# Patient Record
Sex: FEMALE | Race: WHITE | NOT HISPANIC OR LATINO | Employment: OTHER | URBAN - METROPOLITAN AREA
[De-identification: names, ages, dates, MRNs, and addresses within clinical notes are randomized per-mention and may not be internally consistent; named-entity substitution may affect disease eponyms.]

---

## 2017-06-21 ENCOUNTER — HOSPITAL ENCOUNTER (OUTPATIENT)
Dept: RADIOLOGY | Facility: HOSPITAL | Age: 67
Discharge: HOME | End: 2017-06-21
Payer: COMMERCIAL

## 2017-06-21 DIAGNOSIS — Z12.31 VISIT FOR SCREENING MAMMOGRAM: ICD-10-CM

## 2017-06-21 PROCEDURE — G0202 SCR MAMMO BI INCL CAD: HCPCS

## 2018-04-06 ENCOUNTER — TRANSCRIBE ORDERS (OUTPATIENT)
Dept: ADMINISTRATIVE | Facility: HOSPITAL | Age: 68
End: 2018-04-06

## 2018-04-06 DIAGNOSIS — Z12.39 SCREENING BREAST EXAMINATION: Primary | ICD-10-CM

## 2018-05-01 ENCOUNTER — HOSPITAL ENCOUNTER (OUTPATIENT)
Dept: RADIOLOGY | Facility: HOSPITAL | Age: 68
Discharge: HOME | End: 2018-05-01
Payer: COMMERCIAL

## 2018-05-01 DIAGNOSIS — Z12.39 SCREENING BREAST EXAMINATION: ICD-10-CM

## 2018-05-01 PROCEDURE — 77067 SCR MAMMO BI INCL CAD: CPT

## 2021-03-01 ENCOUNTER — EVALUATION (OUTPATIENT)
Dept: PHYSICAL THERAPY | Facility: REHABILITATION | Age: 71
End: 2021-03-01
Payer: MEDICARE

## 2021-03-01 DIAGNOSIS — M72.2 PLANTAR FASCIITIS OF LEFT FOOT: Primary | ICD-10-CM

## 2021-03-01 PROCEDURE — 97161 PT EVAL LOW COMPLEX 20 MIN: CPT | Performed by: PHYSICAL THERAPIST

## 2021-03-01 PROCEDURE — 97110 THERAPEUTIC EXERCISES: CPT | Performed by: PHYSICAL THERAPIST

## 2021-03-01 RX ORDER — NAPROXEN 500 MG/1
500 TABLET ORAL 2 TIMES DAILY WITH MEALS
COMMUNITY

## 2021-03-01 RX ORDER — ACETAMINOPHEN 325 MG/1
650 TABLET ORAL EVERY 6 HOURS PRN
COMMUNITY

## 2021-03-01 NOTE — LETTER
2021    Blanco Hawthorne, [address] [facility] [address]    Patient: Anca Finley   YOB: 1950   Date of Visit: 3/1/2021     Encounter Diagnosis     ICD-10-CM    1  Plantar fasciitis of left foot  M72 2        Dear Dr Meryle Roman: Thank you for your recent referral of Anca Finley  Please review the attached evaluation summary from Molly's recent visit  Please verify that you agree with the plan of care by signing the attached order  If you have any questions or concerns, please do not hesitate to call  I sincerely appreciate the opportunity to share in the care of one of your patients and hope to have another opportunity to work with you in the near future  Sincerely,    Patricia Aviles, PT      Referring Provider:      I certify that I have read the below Plan of Care and certify the need for these services furnished under this plan of treatment while under my care  Blanco Hawthorne, [address] [facility] [address]  Via Fax: [phone]          PT Evaluation     Today's date: 3/1/2021  Patient name: Anca Finley  : 1950  MRN: [id number]  Referring provider: Fina Green DPM  Dx:   Encounter Diagnosis     ICD-10-CM    1  Plantar fasciitis of left foot  M72 2                   Assessment  Assessment details: Anca Finley is a 65 y o  female referred to outpt PT with dx of left heel pain  Pt presents with decrease in left hip strength primarily glute med, positive pain along med calcaneus, and mild ankle weakness  Pt funct is limited with decrease in tolerance to standing greater than 15-20 mins and decrease in walking for distance tolerance  Pt would benefit from skilled PT to address these limitations and max funct  Impairments: impaired physical strength, lacks appropriate home exercise program and pain with function    Goals  Funct 1  Stand 30+ minutes with pain <2/10 x4 weeks    2  Ambulation tolerance for 1/2 x4 weeks  Impairment 1  Increase strength by 1/2 grade x4 weeks  2  Decrease pain by 25 % x4 weeks        Plan  Patient would benefit from: skilled physical therapy  Planned modality interventions: low level laser therapy and cryotherapy  Planned therapy interventions: neuromuscular re-education, strengthening, home exercise program and therapeutic exercise  Frequency: 2x week  Duration in weeks: 4        Subjective Evaluation    History of Present Illness  Mechanism of injury: Pt notes that in November she began with left heel pain with insidious onset  Pt notes that sx's progressed over the next few months and went to see PCP  Pt was then referred to DPM  Pt was issued steroid injection with a "good" amount of relief, but cont to have pain  Pt was placed in [place] and notes similar return of sx's after removing  Pt reports that she has min pain in AM when getting OOB and denies sleep disturbances due to pain  Pt notes pain becomes worse when standing for prolonged periods (greater than 15-20 minutes)  Pt was issued stretching from [facility] and performs regularly with use of ice and golf ball massage  Pt is able to amb around her home without restrictions, but does note attempting to walk for distance would be limited due to knee and left foot pain  Pt is taking tylenol and aleve to assist with pain and sx's with some relief  Pt scores a 51 on her FOTO index  Pt has a scheduled f/u with DPM in 4 weeks on 3/24/21  During objective measures-pt does note that she was dx'd with stress fracture per DPM and was in [place] x4 weeks     Pain  Current pain ratin  At best pain ratin  At worst pain ratin      Diagnostic Tests  X-ray: normal  Patient Goals  Patient goals for therapy: independence with ADLs/IADLs and decreased pain          Objective     Active Range of Motion   Left Ankle/Foot   Normal active range of motion    Additional Active Range of Motion Details  Pt denies pain with AROM tested as above  Passive Range of Motion   Left Ankle/Foot  Normal passive range of motion    Additional Passive Range of Motion Details  Normal PROM including MTP flex/ext pain free  Strength/Myotome Testing     Left Hip   Planes of Motion   Flexion: 5  Extension: 4+  Abduction: 4-  Adduction: 5  External rotation: 4+  Internal rotation: 4+    Right Hip   Planes of Motion   Flexion: 5  Extension: 4+  Abduction: 4+  Adduction: 5  External rotation: 5  Internal rotation: 5    Left Ankle/Foot   Dorsiflexion: 5  Plantar flexion: 5  Inversion: 4+  Eversion: 4+  Great toe flexion: 4+  Great toe extension: 5    Additional Strength Details  Knee flex/ext 5/5 bilat  Pt demo's good talar and forefoot alignment in standing with hip in neutral   Pt maintain good longitudinal arch position as well in standing  After marching in place, pt demo's no lateral toes seen from P to A  Pt is negative TTP, however does c/o sx's occurring as sharp in med calcaneus  Tests   Left Ankle/Foot   Negative for anterior drawer, calcaneal squeeze, eversion talar tilt, inversion talar tilt, navicular drop, syndesmosis squeeze, syndesmosis external rotation, Tinel's sign (tarsal tunnel), valgus stress metatarsophalangeal, varus stress metatarsophalangeal, valgus tilt, varus tilt and windlass  Additional Tests Details  Pt does demo varicose veins present in similar location of pain-primarily dominant in WB           Precautions: None        Manual  3/1/21                                                                                   Neuro Re-Ed             bridges          clamshells           SLS           TR back to bar           OH reach at wall glute squeeze                                          Therex            rec bike w/u            hip abd SL Hep-review NV            heel raises                                                                                                  Gait Training Modalities             LASER heel pain, chronic  8 mins                                 **Pt educated in hip strengthening exercises to help support her LE from higher in chain during standing activities  Pt encouraged to cont to use sneakers within home for proper support

## 2021-03-01 NOTE — PROGRESS NOTES
PT Evaluation     Today's date: 3/1/2021  Patient name: Ezequiel Westbrook  : 1950  MRN: [id number]  Referring provider: Leobardo Wagoner DPM  Dx:   Encounter Diagnosis     ICD-10-CM    1  Plantar fasciitis of left foot  M72 2                   Assessment  Assessment details: Ezequiel Westbrook is a 65 y o  female referred to outpt PT with dx of left heel pain  Pt presents with decrease in left hip strength primarily glute med, positive pain along med calcaneus, and mild ankle weakness  Pt funct is limited with decrease in tolerance to standing greater than 15-20 mins and decrease in walking for distance tolerance  Pt would benefit from skilled PT to address these limitations and max funct  Impairments: impaired physical strength, lacks appropriate home exercise program and pain with function    Goals  Funct 1  Stand 30+ minutes with pain <2/10 x4 weeks  2   Ambulation tolerance for 1/2 x4 weeks  Impairment 1  Increase strength by 1/2 grade x4 weeks  2  Decrease pain by 25 % x4 weeks        Plan  Patient would benefit from: skilled physical therapy  Planned modality interventions: low level laser therapy and cryotherapy  Planned therapy interventions: neuromuscular re-education, strengthening, home exercise program and therapeutic exercise  Frequency: 2x week  Duration in weeks: 4        Subjective Evaluation    History of Present Illness  Mechanism of injury: Pt notes that in November she began with left heel pain with insidious onset  Pt notes that sx's progressed over the next few months and went to see PCP  Pt was then referred to [facility]  Pt was issued steroid injection with a "good" amount of relief, but cont to have pain  [facility] and notes similar return of sx's after removing  Pt reports that she has min pain in AM when getting OOB and denies sleep disturbances due to pain  Pt notes pain becomes worse when standing for prolonged periods (greater than 15-20 minutes)    Pt was issued stretching from [place] and performs regularly with use of ice and golf ball massage  Pt is able to amb around her home without restrictions, but does note attempting to walk for distance would be limited due to knee and left foot pain  Pt is taking tylenol and aleve to assist with pain and sx's with some relief  Pt scores a 51 on her FOTO index  Pt has a scheduled f/u with DPM in 4 weeks on 3/24/21  During objective measures-pt does note that she was dx'd with stress fracture per DPM and was in [place] x4 weeks  Pain  Current pain ratin  At best pain ratin  At worst pain ratin      Diagnostic Tests  X-ray: normal  Patient Goals  Patient goals for therapy: independence with ADLs/IADLs and decreased pain          Objective     Active Range of Motion   Left Ankle/Foot   Normal active range of motion    Additional Active Range of Motion Details  Pt denies pain with AROM tested as above  Passive Range of Motion   Left Ankle/Foot  Normal passive range of motion    Additional Passive Range of Motion Details  Normal PROM including MTP flex/ext pain free  Strength/Myotome Testing     Left Hip   Planes of Motion   Flexion: 5  Extension: 4+  Abduction: 4-  Adduction: 5  External rotation: 4+  Internal rotation: 4+    Right Hip   Planes of Motion   Flexion: 5  Extension: 4+  Abduction: 4+  Adduction: 5  External rotation: 5  Internal rotation: 5    Left Ankle/Foot   Dorsiflexion: 5  Plantar flexion: 5  Inversion: 4+  Eversion: 4+  Great toe flexion: 4+  Great toe extension: 5    Additional Strength Details  Knee flex/ext 5/5 bilat  Pt demo's good talar and forefoot alignment in standing with hip in neutral   Pt maintain good longitudinal arch position as well in standing  After marching in place, pt demo's no lateral toes seen from P to A  Pt is negative TTP, however does c/o sx's occurring as sharp in med calcaneus        Tests   Left Ankle/Foot   Negative for anterior drawer, calcaneal squeeze, eversion talar tilt, inversion talar tilt, navicular drop, syndesmosis squeeze, syndesmosis external rotation, Tinel's sign (tarsal tunnel), valgus stress metatarsophalangeal, varus stress metatarsophalangeal, valgus tilt, varus tilt and windlass  Additional Tests Details  Pt does demo varicose veins present in similar location of pain-primarily dominant in WB  Precautions: None        Manual  3/1/21                                                                                   Neuro Re-Ed             bridges          [facility]           SLS           TR back to bar           OH reach at wall glute squeeze                                          Therex            rec bike w/u            hip abd SL Hep-review NV            heel raises                                                                                                  Gait Training                                 Modalities             LASER heel pain, chronic  8 mins                                 **Pt educated in hip strengthening exercises to help support her LE from higher in chain during standing activities  Pt encouraged to cont to use sneakers within home for proper support

## 2021-03-02 ENCOUNTER — OFFICE VISIT (OUTPATIENT)
Dept: PHYSICAL THERAPY | Facility: REHABILITATION | Age: 71
End: 2021-03-02
Payer: MEDICARE

## 2021-03-02 ENCOUNTER — TRANSCRIBE ORDERS (OUTPATIENT)
Dept: PHYSICAL THERAPY | Facility: REHABILITATION | Age: 71
End: 2021-03-02

## 2021-03-02 DIAGNOSIS — M72.2 PLANTAR FASCIITIS OF LEFT FOOT: Primary | ICD-10-CM

## 2021-03-02 DIAGNOSIS — M72.2 PLANTAR FASCIAL FIBROMATOSIS: Primary | ICD-10-CM

## 2021-03-02 PROCEDURE — 97110 THERAPEUTIC EXERCISES: CPT | Performed by: PHYSICAL THERAPIST

## 2021-03-02 PROCEDURE — 97112 NEUROMUSCULAR REEDUCATION: CPT | Performed by: PHYSICAL THERAPIST

## 2021-03-02 NOTE — PROGRESS NOTES
Daily Note     Today's date: 3/2/2021  Patient name: Jackie Araujo  : 1950  MRN: [id number]  Referring provider: Jil Soto DPM  Dx:   Encounter Diagnosis     ICD-10-CM    1  Plantar fasciitis of left foot  M72 2                   Subjective: Pt reports "feeling improvement" yesterday with standing to prepare dinner  Pt notes less pain but cont to use ice at the end of her day  Objective: See treatment diary below    Precautions: None        Manual  3/1/21  3/2/21                                                                                     Neuro Re-Ed             bridges    5"x10          clamshells    3"x10          SLS             TR back to bar   3"x10          OH reach at wall glute squeeze    5"x10                                                    Therex             rec bike w/u    5 mins           hip abd SL Hep-review NV  3"x10           heel raises    5"x10                                                                                               Gait Training                                 Modalities             LASER heel pain, chronic  8 mins   8 mins                                               Assessment: Performed WB activities with ability to tolerate ankle heel raise/toe raise without pain  Added wall slide with glute squeeze to assist with core and glute contraction during standing in which pt noted mild sx's, changed weight shift from forefoot to midfoot with improvement in sx's  Pt fatigue with glute exercises but denies all pain  Pt noted relief with LASER and encouraged to cont to ice in the evening as needed  Plan: Continue per plan of care

## 2021-03-08 ENCOUNTER — OFFICE VISIT (OUTPATIENT)
Dept: PHYSICAL THERAPY | Facility: REHABILITATION | Age: 71
End: 2021-03-08
Payer: MEDICARE

## 2021-03-08 DIAGNOSIS — M72.2 PLANTAR FASCIITIS OF LEFT FOOT: Primary | ICD-10-CM

## 2021-03-08 PROCEDURE — 97110 THERAPEUTIC EXERCISES: CPT | Performed by: PHYSICAL THERAPIST

## 2021-03-08 PROCEDURE — 97112 NEUROMUSCULAR REEDUCATION: CPT | Performed by: PHYSICAL THERAPIST

## 2021-03-08 NOTE — PROGRESS NOTES
Daily Note     Today's date: 3/8/2021  Patient name: Leeroy Phillips  : 1950  MRN: [id number]  Referring provider: Greg Gordon DPM  Dx:   Encounter Diagnosis     ICD-10-CM    1  Plantar fasciitis of left foot  M72 2                   Subjective: Pt reports doing well after last visit  Pt does note that she was standing/walking more frequently earlier this AM due to waiting for 2nd vaccine and notes some pain due to increase in standing  Objective: See treatment diary below    Precautions: None        Manual  3/1/21  3/2/21  3/8/21                                                                                   Neuro Re-Ed             bridges    5"x10   5"x20       clamshells    3"x10  3"x20        SLS             TR back to bar   3"x10   3"x20        OH reach at wall glute squeeze    5"x10   5"x10                                                  Therex             rec bike w/u    5 mins   5 mins         hip abd SL Hep-review NV  3"x10   3"x20         heel raises    5"x10   5"x20                                                                                             Gait Training                                 Modalities             LASER heel pain, chronic  8 mins   8 mins   8 mins                                            Assessment: Pt cont to note improvement with pain and sx's after exercises as issued with focus on glute strengthening  Pt issued HEP and encouraged to cont 1x/day to assist with alignment and stability  Pt notes most relief at end of session with LASER tx  Plan: Continue per plan of care

## 2021-03-11 ENCOUNTER — OFFICE VISIT (OUTPATIENT)
Dept: PHYSICAL THERAPY | Facility: REHABILITATION | Age: 71
End: 2021-03-11
Payer: MEDICARE

## 2021-03-11 DIAGNOSIS — M72.2 PLANTAR FASCIITIS OF LEFT FOOT: Primary | ICD-10-CM

## 2021-03-11 PROCEDURE — 97140 MANUAL THERAPY 1/> REGIONS: CPT | Performed by: PHYSICAL THERAPIST

## 2021-03-11 PROCEDURE — 97112 NEUROMUSCULAR REEDUCATION: CPT | Performed by: PHYSICAL THERAPIST

## 2021-03-11 PROCEDURE — 97110 THERAPEUTIC EXERCISES: CPT | Performed by: PHYSICAL THERAPIST

## 2021-03-11 NOTE — PROGRESS NOTES
Daily Note     Today's date: 3/11/2021  Patient name: Greyson Leiva  : 1950  MRN: [id number]  Referring provider: Rhiannon Noriega DPM  Dx:   Encounter Diagnosis     ICD-10-CM    1  Plantar fasciitis of left foot  M72 2                   Subjective: Pt reports doing well after last visit  Pt does note she has slight pain in L foot from increased activity today  Objective: See treatment diary below    Precautions: None        Manual  3/1/21  3/2/21  3/8/21  3/11/21                                                                                 Neuro Re-Ed             bridges    5"x10   5"x20  5"x20     clamshells    3"x10  3"x20   3"x20     SLS        10"x3     TR back to bar   3"x10   3"x20   3"x20     OH reach at wall glute squeeze    5"x10   5"x10   5"x15  alt march at wall nv    mini squat        nv      tandem walk         nv      tandem stance on foam       nv     Therex             rec bike w/u    5 mins   5 mins   5 mins      hip abd SL Hep-review NV  3"x10   3"x20   3"x20      heel raises    5"x10   5"x20   5"x20      Side stepping        15ft x2 ea                                                                            Gait Training                                 Modalities             LASER heel pain, chronic  8 mins   8 mins   8 mins  10 mins                                          Assessment:  Pt tolerated treatment well, with no increase in pain following exercises  Pt notes most relief at end of session with LASER tx  Pt continues to demonstrate difficulty with SLS balance  Plan: Continue per plan of care  Increase difficulty for side stepping with tband  Patient was treated by CHERYL Shoemaker under direct supervision of Mehrdad Smith, PT, DPT, SCS

## 2021-03-15 ENCOUNTER — OFFICE VISIT (OUTPATIENT)
Dept: PHYSICAL THERAPY | Facility: REHABILITATION | Age: 71
End: 2021-03-15
Payer: MEDICARE

## 2021-03-15 DIAGNOSIS — M72.2 PLANTAR FASCIITIS OF LEFT FOOT: Primary | ICD-10-CM

## 2021-03-15 PROCEDURE — 97112 NEUROMUSCULAR REEDUCATION: CPT | Performed by: PHYSICAL THERAPIST

## 2021-03-15 PROCEDURE — 97140 MANUAL THERAPY 1/> REGIONS: CPT | Performed by: PHYSICAL THERAPIST

## 2021-03-15 PROCEDURE — 97110 THERAPEUTIC EXERCISES: CPT | Performed by: PHYSICAL THERAPIST

## 2021-03-18 ENCOUNTER — OFFICE VISIT (OUTPATIENT)
Dept: PHYSICAL THERAPY | Facility: REHABILITATION | Age: 71
End: 2021-03-18
Payer: MEDICARE

## 2021-03-18 DIAGNOSIS — M72.2 PLANTAR FASCIITIS OF LEFT FOOT: Primary | ICD-10-CM

## 2021-03-18 PROCEDURE — 97140 MANUAL THERAPY 1/> REGIONS: CPT | Performed by: PHYSICAL THERAPIST

## 2021-03-18 PROCEDURE — 97112 NEUROMUSCULAR REEDUCATION: CPT | Performed by: PHYSICAL THERAPIST

## 2021-03-18 PROCEDURE — 97110 THERAPEUTIC EXERCISES: CPT | Performed by: PHYSICAL THERAPIST

## 2021-03-18 NOTE — PROGRESS NOTES
Daily Note     Today's date: 3/18/2021  Patient name: Damaris Laguna  : 1950  MRN: [id number]  Referring provider: Maryann Chawla DPM  Dx:   Encounter Diagnosis     ICD-10-CM    1  Plantar fasciitis of left foot  M72 2                   Subjective: Pt reports worse pain today, noting that it's Thursday and she's been running a lot of errands  Objective: See treatment diary below    Precautions: None        Manual  3/18/21  3/2/21  3/8/21  3/11/21  3/15/21                                                                               Neuro Re-Ed             bridges    5"x10   5"x20  5"x20  hep   clamshells    3"x10  3"x20   3"x20  hep   SLS  np      10"x3  10"x3    TR back to bar  3" x20 3"x10   3"x20   3"x20  3"x25    Alt arm wall slide with ipsilateral march  5"x10 ea  5"x10   5"x10   5"x15  alt march at wall nv    mini squat  np      nv  3"x10     tandem walk   np      nv  10"x2 ea    tandem stance on foam  np     nv  x2 ea   Therex             rec bike w/u  7 mins  5 mins   5 mins   5 mins  6 mins     hip abd SL Hep-review NV  3"x10   3"x20   3"x20  hep    heel raises  3"x30  5"x10   5"x20   5"x20  5"x25     Side stepping  np      15ft x2 ea  ytb 20'x2 ea    prostretch 30"x2                                                                    Gait Training                                 Modalities             LASER heel pain, chronic  10 mins   8 mins   8 mins  10 mins  10 mins                                         Assessment:  Despite pain, pt is able to perform standing exercises without increase in sx's  Added wall slide with march in which pt demo's pelvic elevation while marching on left  Checked and cleared lumbar spine with performing repeated tests  Pt has no aggravation of sx's, however does note improvement in pain with repeated lumbar extension  Recommended to pt to perform repeated lumbar standing extension when pain occurs while at counter to see if sx's diminish with performing    Pt cont to note relief with LASER  Plan: Cont per POC

## 2021-03-22 ENCOUNTER — EVALUATION (OUTPATIENT)
Dept: PHYSICAL THERAPY | Facility: REHABILITATION | Age: 71
End: 2021-03-22
Payer: MEDICARE

## 2021-03-22 DIAGNOSIS — M72.2 PLANTAR FASCIITIS OF LEFT FOOT: Primary | ICD-10-CM

## 2021-03-22 PROCEDURE — 97140 MANUAL THERAPY 1/> REGIONS: CPT | Performed by: PHYSICAL THERAPIST

## 2021-03-22 PROCEDURE — 97110 THERAPEUTIC EXERCISES: CPT | Performed by: PHYSICAL THERAPIST

## 2021-03-22 NOTE — PROGRESS NOTES
PT Re-Evaluation     Today's date: 3/22/2021  Patient name: Ivanna Ram  : 1950  MRN: [id number]  Referring provider: Caleb Sheridan DPM  Dx:   Encounter Diagnosis     ICD-10-CM    1  Plantar fasciitis of left foot  M72 2                   Assessment  Assessment details: Pt is progressing well with PT with increase in LE strength, improvement in funct activities especially walking and standing prolonged periods, and independent with HEP  Pt does cont to have high levels of pain with prolonged standing and walking  Pt would benefit from cont skilled PT to address these limitations and max funct  Impairments: impaired physical strength, lacks appropriate home exercise program and pain with function    Goals  Funct 1  Stand 30+ minutes with pain <2/10 x4 weeks  -partially met  2  Ambulation tolerance for 1/2 x4 weeks  -patially met  Impairment 1  Increase strength by 1/2 grade x4 weeks  --partially met  2  Decrease pain by 25 % x4 weeks-partially met        Plan  Patient would benefit from: skilled physical therapy  Planned modality interventions: low level laser therapy and cryotherapy  Planned therapy interventions: neuromuscular re-education, strengthening, home exercise program and therapeutic exercise  Frequency: 2x week  Duration in weeks: 4        Subjective Evaluation    History of Present Illness  Mechanism of injury: Pt notes improvement in PT in which she cont to have less pain when standing prolonged and able to tolerate for indefinite time period, yet still having high levels of pain but able to perform funct standing tasks  Pt denies restrictions from pain, but does note increase in sx's with busier scheduled days vs more resting days  Pt cont to have no pain at rest and no pain when getting OOB in AM or after sitting prolonged  Pt is taking OTC meds infrequently  Pt reports 75% improvement since beginning PT      Pain  Current pain ratin  At best pain ratin  At worst pain rating: 8      Diagnostic Tests  X-ray: normal  Patient Goals  Patient goals for therapy: independence with ADLs/IADLs and decreased pain  Patient goal: partially met        Objective     Active Range of Motion   Left Ankle/Foot   Normal active range of motion    Passive Range of Motion   Left Ankle/Foot  Normal passive range of motion    Additional Passive Range of Motion Details  Normal PROM including MTP flex/ext pain free  Strength/Myotome Testing     Left Hip   Planes of Motion   Flexion: 5  Extension: 4+  Abduction: 4  Adduction: 5  External rotation: 4+  Internal rotation: 4+    Right Hip   Planes of Motion   Flexion: 5  Extension: 4+  Abduction: 4+  Adduction: 5  External rotation: 5  Internal rotation: 5    Left Ankle/Foot   Dorsiflexion: 5  Plantar flexion: 4-  Inversion: 4+  Eversion: 5  Great toe flexion: 4+  Great toe extension: 5    Additional Strength Details  Knee flex/ext 5/5 bilat  Pt denies TTP present in left heel  Pt performs SLS on even surface with positive knee recurvatum and diff holding greater than 1-2 sec without HHA  When encouraged pt to perform slight knee flexion and "unlock" left knee, she was able to maintain up to 10 secs  Pt able to perform single leg heel raise up to 10 reps, demo's diminished height of lift throughout heel raise with fatigue and no pain       Tests   Left Ankle/Foot   Negative for anterior drawer, calcaneal squeeze, eversion talar tilt, inversion talar tilt, navicular drop, syndesmosis squeeze, syndesmosis external rotation, Tinel's sign (tarsal tunnel), valgus stress metatarsophalangeal, varus stress metatarsophalangeal, valgus tilt, varus tilt and windlass         Precautions: None        [facility] 3/18/21 3/22/21  3/8/21  3/11/21  3/15/21    RE   33 mins                                                                       Neuro Re-Ed             bridges   np  5"x20  5"x20  hep   Clamshells   hep 3"x20   3"x20  hep   SLS  np Tested NV    10"x3  10"x3    TR back to bar  3" x20 hep  3"x20   3"x20  3"x25    Alt arm wall slide with ipsilateral march  5"x10 ea hep  5"x10   5"x15  alt march at wall nv    mini squat  np  NV    nv  3"x10     tandem walk   np NV    nv  10"x2 ea    tandem stance on foam  np NV   nv  x2 ea   Therex             rec bike w/u  7 mins 7 mins  5 mins   5 mins  6 mins     hip abd SL Hep-review NV hep  3"x20   3"x20  hep    heel raises  3"x30 hep  5"x20   5"x20  5"x25     Side stepping  np NV    15ft x2 ea  ytb 20'x2 ea    prostretch 30"x2 NV          single leg HR   NV          ankle inv tband   NV                                      Gait Training                                 Modalities             LASER heel pain, chronic  10 mins  10 mins   8 mins  10 mins  10 mins

## 2021-03-22 NOTE — LETTER
2021    Yusuf Cherry, [address] [facility] [address]    Patient: Melo Jones   YOB: 1950   Date of Visit: 3/22/2021     Encounter Diagnosis     ICD-10-CM    1  Plantar fasciitis of left foot  M72 2        Dear Dr Zeke Mcfarland: Thank you for your recent referral of Melo Jones  Please review the attached evaluation summary from Molly's recent visit  Please verify that you agree with the plan of care by signing the attached order  If you have any questions or concerns, please do not hesitate to call  I sincerely appreciate the opportunity to share in the care of one of your patients and hope to have another opportunity to work with you in the near future  Sincerely,    Myke Alcaraz, PT      Referring Provider:      I certify that I have read the below Plan of Care and certify the need for these services furnished under this plan of treatment while under my care  Yusuf Cherry, [address] [facility] [address]  Via Fax: [phone]          PT Re-Evaluation     Today's date: 3/22/2021  Patient name: Melo Jones  : 1950  MRN: [id number]  Referring provider: Lorren Sever, DPM  Dx:   Encounter Diagnosis     ICD-10-CM    1  Plantar fasciitis of left foot  M72 2                   Assessment  Assessment details: Pt is progressing well with PT with increase in LE strength, improvement in funct activities especially walking and standing prolonged periods, and independent with HEP  Pt does cont to have high levels of pain with prolonged standing and walking  Pt would benefit from cont skilled PT to address these limitations and max funct  Impairments: impaired physical strength, lacks appropriate home exercise program and pain with function    Goals  Funct 1  Stand 30+ minutes with pain <2/10 x4 weeks  -partially met  2  Ambulation tolerance for 1/2 x4 weeks  -patially met  Impairment 1   Increase strength by 1/2 grade x4 weeks  --partially met  2  Decrease pain by 25 % x4 weeks-partially met        Plan  Patient would benefit from: skilled physical therapy  Planned modality interventions: low level laser therapy and cryotherapy  Planned therapy interventions: neuromuscular re-education, strengthening, home exercise program and therapeutic exercise  Frequency: 2x week  Duration in weeks: 4        Subjective Evaluation    History of Present Illness  Mechanism of injury: Pt notes improvement in PT in which she cont to have less pain when standing prolonged and able to tolerate for indefinite time period, yet still having high levels of pain but able to perform funct standing tasks  Pt denies restrictions from pain, but does note increase in sx's with busier scheduled days vs more resting days  Pt cont to have no pain at rest and no pain when getting OOB in AM or after sitting prolonged  Pt is taking OTC meds infrequently  Pt reports 75% improvement since beginning PT  Pain  Current pain ratin  At best pain ratin  At worst pain ratin      Diagnostic Tests  X-ray: normal  Patient Goals  Patient goals for therapy: independence with ADLs/IADLs and decreased pain  Patient goal: partially met        Objective     Active Range of Motion   Left Ankle/Foot   Normal active range of motion    Passive Range of Motion   Left Ankle/Foot  Normal passive range of motion    Additional Passive Range of Motion Details  Normal PROM including MTP flex/ext pain free        Strength/Myotome Testing     Left Hip   Planes of Motion   Flexion: 5  Extension: 4+  Abduction: 4  Adduction: 5  External rotation: 4+  Internal rotation: 4+    Right Hip   Planes of Motion   Flexion: 5  Extension: 4+  Abduction: 4+  Adduction: 5  External rotation: 5  Internal rotation: 5    Left Ankle/Foot   Dorsiflexion: 5  Plantar flexion: 4-  Inversion: 4+  Eversion: 5  Great toe flexion: 4+  Great toe extension: 5    Additional Strength Details  Knee flex/ext 5/5 bilat  Pt denies TTP present in left heel  Pt performs SLS on even surface with positive knee recurvatum and diff holding greater than 1-2 sec without HHA  When encouraged pt to perform slight knee flexion and "unlock" left knee, she was able to maintain up to 10 secs  Pt able to perform single leg heel raise up to 10 reps, demo's diminished height of lift throughout heel raise with fatigue and no pain       Tests   Left Ankle/Foot   Negative for anterior drawer, calcaneal squeeze, eversion talar tilt, inversion talar tilt, navicular drop, syndesmosis squeeze, syndesmosis external rotation, Tinel's sign (tarsal tunnel), valgus stress metatarsophalangeal, varus stress metatarsophalangeal, valgus tilt, varus tilt and windlass         Precautions: None        Manual  3/18/21 3/22/21  3/8/21  3/11/21  3/15/21    RE   33 mins                                                                       Neuro Re-Ed             bridges   np  5"x20  5"x20  hep   Clamshells   hep 3"x20   3"x20  hep   SLS  np Tested NV    10"x3  10"x3    TR back to bar  3" x20 hep  3"x20   3"x20  3"x25    Alt arm wall slide with ipsilateral march  5"x10 ea hep  5"x10   5"x15  alt march at wall nv    mini squat  np  NV    nv  3"x10     tandem walk   np NV    nv  10"x2 ea    tandem stance on foam  np NV   nv  x2 ea   Therex             rec bike w/u  7 mins 7 mins  5 mins   5 mins  6 mins     hip abd SL Hep-review NV hep  3"x20   3"x20  hep    heel raises  3"x30 hep  5"x20   5"x20  5"x25     Side stepping  np NV    15ft x2 ea  ytb 20'x2 ea    prostretch 30"x2 NV          single leg HR   NV          ankle inv tband   NV                                      Gait Training                                 Modalities             LASER heel pain, chronic  10 mins  10 mins   8 mins  10 mins  10 mins

## 2021-03-29 ENCOUNTER — OFFICE VISIT (OUTPATIENT)
Dept: PHYSICAL THERAPY | Facility: REHABILITATION | Age: 71
End: 2021-03-29
Payer: MEDICARE

## 2021-03-29 DIAGNOSIS — M72.2 PLANTAR FASCIITIS OF LEFT FOOT: Primary | ICD-10-CM

## 2021-03-29 PROCEDURE — 97110 THERAPEUTIC EXERCISES: CPT | Performed by: PHYSICAL THERAPIST

## 2021-03-29 PROCEDURE — 97112 NEUROMUSCULAR REEDUCATION: CPT | Performed by: PHYSICAL THERAPIST

## 2021-03-29 NOTE — PROGRESS NOTES
Daily Note     Today's date: 3/29/2021  Patient name: Kay Ordonez  : 1950  MRN: [id number]  Referring provider: Aaron Cabezas DPM  Dx:   Encounter Diagnosis     ICD-10-CM    1  Plantar fasciitis of left foot  M72 2                   Subjective: Pt had f/u with DPM last week in which she recommended she cont PT and is concerned that her achilles is also involved  Objective: See treatment diary below    Precautions: None        Manual  3/18/21 3/22/21 3/29/21  3/11/21  3/15/21    RE   33 mins                                                                       Neuro Re-Ed             bridges   np hep  5"x20  hep   Clamshells   Hep hep  3"x20  hep   SLS  np Tested NV  nv  10"x3  10"x3    TR back to bar  3" x20 hep  3"x20   3"x20 3"2x20    Alt arm wall slide with ipsilateral march  5"x10 ea hep hep  5"x15 He[    mini squat  np  NV 3"x20   nv  3"x10     tandem walk   np NV  10'x2  nv  10"x2 ea    tandem stance on foam  np NV 15"x2 ea nv  x2 ea   Therex             rec bike w/u  7 mins 7 mins  5 mins   5 mins  6 mins     hip abd SL Hep-review NV hep  3"x20   3"x20  hep    heel raises  3"x30 hep  5"x20   5"x20  5"x25     Side stepping  np NV  rtb 20'x2   15ft x2 ea  ytb 20'x2 ea    prostretch 30"x2 NV  30"x3         single leg HR   NV  NV        ankle inv tband   NV  rtb 3"x20                                     Gait Training                                 Modalities             LASER heel pain, chronic  10 mins  10 mins   8 mins  10 mins  10 mins                                         Assessment: Pt demo's more difficulty with tandem stance on foam with left foot forward, left behind and demo's min weight bearing through right LE  Pt encouraged to shift her weight equally and was able to balance with improved tolerance  Pt encouraged to focus on weight bearing when standing prolonged at counter as she is compensating from right LE/knee pain with increase in pressure through left    Good progress with standing activities and cont to note relief with LASER  Plan: Continue per plan of care

## 2021-04-02 ENCOUNTER — APPOINTMENT (OUTPATIENT)
Dept: PHYSICAL THERAPY | Facility: REHABILITATION | Age: 71
End: 2021-04-02
Payer: MEDICARE

## 2021-04-05 ENCOUNTER — OFFICE VISIT (OUTPATIENT)
Dept: PHYSICAL THERAPY | Facility: REHABILITATION | Age: 71
End: 2021-04-05
Payer: MEDICARE

## 2021-04-05 DIAGNOSIS — M72.2 PLANTAR FASCIITIS OF LEFT FOOT: Primary | ICD-10-CM

## 2021-04-05 PROCEDURE — 97112 NEUROMUSCULAR REEDUCATION: CPT | Performed by: PHYSICAL THERAPIST

## 2021-04-05 PROCEDURE — 97110 THERAPEUTIC EXERCISES: CPT | Performed by: PHYSICAL THERAPIST

## 2021-04-05 NOTE — PROGRESS NOTES
Daily Note     Today's date: 2021  Patient name: Bishnu Wagoner  : 1950  MRN: [id number]  Referring provider: Mark Currie DPM  Dx:   Encounter Diagnosis     ICD-10-CM    1  Plantar fasciitis of left foot  M72 2                   Subjective: Pt notes less severe pain but reports "it cont to be there "  Pt was able to find a comfortable pair of shoes that she wore yesterday with less sx's throughout the day  Objective: See treatment diary below    Precautions: None        Manual  3/18/21 3/22/21 3/29/21 4/5/21  3/15/21    RE   33 mins                                                                       Neuro Re-Ed             bridges   np hep hep  hep   Clamshells   Hep Hep hep  hep   SLS  np Tested NV  nv   10"x3    TR back to bar  3" x20 Hep  3"x20  3"x30  3"2x20    Alt arm wall slide with ipsilateral march  5"x10 ea Hep Hep hep He[    mini squat  np  NV 3"x20  3"x30  3"x10     tandem walk   np NV  10'x2 10'x2   10"x2 ea    tandem stance on foam  np NV 15"x2 ea 15"x3 ea  x2 ea   Therex             rec bike w/u  7 mins 7 mins  5 mins  8 mins  6 mins     hip abd SL Hep-review NV Hep  3"x20  hep  hep    heel raises  3"x30 Hep  5"x20  hep  5"x25     Side stepping  np NV  rtb 20'x2  rtb 20'x2   ytb 20'x2 ea    prostretch 30"x2 NV  30"x3         single leg HR   NV  NV  3"x10 up B, down L      ankle inv tband   NV  rtb 3"x20   rtb 3"x20       prostretch       20"x3                     Gait Training                                 Modalities             LASER heel pain, chronic  10 mins  10 mins   8 mins 8 mins   10 mins                                         Assessment: Pt reports most discomfort present through right knee especially with SLS on right  Pt demo's lateral trunk lean with WB on right secondary to knee pain and right glute weakness  Pt encouraged to cont to focus on upright posture with ambulation and standing vs increase WB towards left due to right knee sx's    Pt denies all pain by the end of PT session  Plan: Continue per plan of care

## 2021-04-08 ENCOUNTER — OFFICE VISIT (OUTPATIENT)
Dept: PHYSICAL THERAPY | Facility: REHABILITATION | Age: 71
End: 2021-04-08
Payer: MEDICARE

## 2021-04-08 DIAGNOSIS — M72.2 PLANTAR FASCIITIS OF LEFT FOOT: Primary | ICD-10-CM

## 2021-04-08 PROCEDURE — 97112 NEUROMUSCULAR REEDUCATION: CPT | Performed by: PHYSICAL THERAPIST

## 2021-04-08 PROCEDURE — 97110 THERAPEUTIC EXERCISES: CPT | Performed by: PHYSICAL THERAPIST

## 2021-04-08 PROCEDURE — 97140 MANUAL THERAPY 1/> REGIONS: CPT | Performed by: PHYSICAL THERAPIST

## 2021-04-08 NOTE — PROGRESS NOTES
Daily Note     Today's date: 2021  Patient name: Brent Long  : 1950  MRN: [id number]  Referring provider: Niranjan Menezes DPM  Dx:   Encounter Diagnosis     ICD-10-CM    1  Plantar fasciitis of left foot  M72 2                   Subjective: Pt reports her heel has been very sore today from walking long distances while shopping  "I had to ice it earlier today because it was very sore "       Objective: See treatment diary below    Precautions: None        [facility] 3/18/21 3/22/21 3/29/21 4/5/21  4/8/21    RE   33 mins                                                                       Neuro Re-Ed            bridges   np hep hep    Clamshells   Hep Hep hep    SLS  np Tested NV  nv     TR back to bar  3" x20 Hep  3"x20  3"x30  3"x30   Alt arm wall slide with ipsilateral march  5"x10 ea Hep Hep hep     mini squat  np  NV 3"x20  3"x30 3"x30    tandem walk   np NV  10'x2 10'x2  10'x2    tandem stance on foam  np NV 15"x2 ea 15"x3 ea 20"x3 ea   Therex            rec bike w/u  7 mins 7 mins  5 mins  8 mins 10 mins    hip abd SL Hep-review NV Hep  3"x20  hep     heel raises  3"x30 Hep  5"x20  hep     Side stepping  np NV  rtb 20'x2  rtb 20'x2  rtb 20'x4    prostretch 30"x2 NV  30"x3    30"x3    single leg HR   NV  NV  3"x10 up B, down L 3"x20 up B, down weight shift to left    ankle inv tband   NV  rtb 3"x20   rtb 3"x20  gtb 3"x20    prostretch       20"x3                   Gait Training                               Modalities             LASER heel pain, chronic  10 mins  10 mins   8 mins 8 mins   8 mins                                         Assessment: Pt continues to demonstrate difficulty with tandem stance and tandem walking with L leg back, however is improving  Unable to perform full HR with just L  Modified to go up with both LE's, and down while weight shifted on L  Pt requires verbal cueing throughout session to perform exercises slowly  Pt reports pain relief following-tx from LASER         Plan: Continue per plan of care  Attempt SLS at f/u to continue targeting balance and stability of ankle  Patient was treated by CHERYL Vasquez under direct supervision by Sabrina Funez, PT, DPT, SCS

## 2021-04-12 ENCOUNTER — OFFICE VISIT (OUTPATIENT)
Dept: PHYSICAL THERAPY | Facility: REHABILITATION | Age: 71
End: 2021-04-12
Payer: MEDICARE

## 2021-04-12 DIAGNOSIS — M72.2 PLANTAR FASCIITIS OF LEFT FOOT: Primary | ICD-10-CM

## 2021-04-12 PROCEDURE — 97110 THERAPEUTIC EXERCISES: CPT | Performed by: PHYSICAL THERAPIST

## 2021-04-12 PROCEDURE — 97140 MANUAL THERAPY 1/> REGIONS: CPT | Performed by: PHYSICAL THERAPIST

## 2021-04-12 PROCEDURE — 97112 NEUROMUSCULAR REEDUCATION: CPT | Performed by: PHYSICAL THERAPIST

## 2021-04-12 NOTE — PROGRESS NOTES
Daily Note     Today's date: 2021  Patient name: Marcial Barraza  : 1950  MRN: [id number]  Referring provider: Kim Zaman DPM  Dx:   Encounter Diagnosis     ICD-10-CM    1  Plantar fasciitis of left foot  M72 2                   Subjective: Pt reports "not too bad" over the weekend and since last PT session  Pt has mild soreness into heel today, but no sharp, stabbing pain  Objective: See treatment diary below    Precautions: None        Manual  4/12/21 3/22/21 3/29/21 4/5/21  4/8/21    RE   33 mins                                                                       Neuro Re-Ed            bridges   np hep hep    Clamshells   Hep Hep hep    SLS  Tested NV  nv     TR back to bar 3"x30  Hep  3"x20  3"x30  3"x30            mini squat 3"x30   NV 3"x20  3"x30 3"x30    tandem walk  10'x2  NV  10'x2 10'x2  10'x2    tandem stance on foam 20"x3 ea NV 15"x2 ea 15"x3 ea 20"x3 ea   Therex            rec bike w/u 8 mins  7 mins  5 mins  8 mins 10 mins                    Side stepping gtb 20'x2 ea NV  rtb 20'x2  rtb 20'x2  rtb 20'x4    prostretch 30"x3 NV  30"x3    30"x3    single leg HR  3"x20 up B, down weight shift to L  NV  NV  3"x10 up B, down L 3"x20 up B, down weight shift to left    ankle inv tband  gtb 3"x20  NV  rtb 3"x20   rtb 3"x20  gtb 3"x20                    Gait Training                               Modalities             LASER heel pain, chronic  8 mins  10 mins   8 mins 8 mins   8 mins                                         Assessment: Pt cont to progress well with standing tolerance and activities without increase in pain  Pt needs less frequent cues with weight shifting towards right at today's session and challenged with tandem walking without HHA  Pt cont to deny pain after PT session and LASER tx  Plan: Continue per plan of care

## 2021-04-15 ENCOUNTER — OFFICE VISIT (OUTPATIENT)
Dept: PHYSICAL THERAPY | Facility: REHABILITATION | Age: 71
End: 2021-04-15
Payer: MEDICARE

## 2021-04-15 DIAGNOSIS — M72.2 PLANTAR FASCIITIS OF LEFT FOOT: Primary | ICD-10-CM

## 2021-04-15 PROCEDURE — 97150 GROUP THERAPEUTIC PROCEDURES: CPT | Performed by: PHYSICAL THERAPIST

## 2021-04-15 NOTE — PROGRESS NOTES
Daily Note     Today's date: 4/15/2021  Patient name: Kulwinder Munoz  : 1950  MRN: [id number]  Referring provider: Braxton Larsen DPM  Dx:   Encounter Diagnosis     ICD-10-CM    1  Plantar fasciitis of left foot  M72 2                   Subjective: Pt cont to have pain and sx's which are worse on Thursday due to increase in general activities  Pt reports that she feels as though her "stress fracture" sx's are improving, but cont with posterior heel pain  Objective: See treatment diary below    Precautions: None        Manual  4/12/21 4/15/21 3/29/21 4/5/21  4/8/21    RE                                                                          Neuro Re-Ed                            SLS  10"x2       TR back to bar 3"x30  3"x30   3"x20  3"x30  3"x30            mini squat 3"x30   NV 3"x20  3"x30 3"x30    tandem walk  10'x2  20'x2   10'x2 10'x2  10'x2    tandem stance on foam 20"x3 ea 20"x3  15"x2 ea 15"x3 ea 20"x3 ea   Therex            rec bike w/u 8 mins  7 mins  5 mins  8 mins 10 mins                    Side stepping gtb 20'x2 ea gtb 20'x2   rtb 20'x2  rtb 20'x2  rtb 20'x4    prostretch 30"x3 NV  30"x3    30"x3    single leg HR  3"x20 up B, down weight shift to L   3"x20 up B down weight shift 3"x20   NV  3"x10 up B, down L 3"x20 up B, down weight shift to left    ankle inv tband  gtb 3"x20  gtb 3"x20   rtb 3"x20   rtb 3"x20  gtb 3"x20                    Gait Training                               Modalities             LASER heel pain, chronic  8 mins  8 mins   8 mins 8 mins   8 mins                                         Assessment: Pt cont to have most restriction with standing activities due to right knee pain greater than left heel sx's  Pt was limited with completing side step with use of tband due to right knee pain  Pt cont to note relief with use of LASER  Pt encouraged to focus on equal WB through bilat LE and to become cognizant of her WB when pain occurs in standing        Plan: Continue per plan of care  Cont to focus on WB strengthening over the next 2 weeks with probable DC at that time to independent HEP

## 2021-04-19 ENCOUNTER — OFFICE VISIT (OUTPATIENT)
Dept: PHYSICAL THERAPY | Facility: REHABILITATION | Age: 71
End: 2021-04-19
Payer: MEDICARE

## 2021-04-19 DIAGNOSIS — M72.2 PLANTAR FASCIITIS OF LEFT FOOT: Primary | ICD-10-CM

## 2021-04-19 PROCEDURE — 97112 NEUROMUSCULAR REEDUCATION: CPT | Performed by: PHYSICAL THERAPIST

## 2021-04-19 PROCEDURE — 97140 MANUAL THERAPY 1/> REGIONS: CPT | Performed by: PHYSICAL THERAPIST

## 2021-04-19 PROCEDURE — 97110 THERAPEUTIC EXERCISES: CPT | Performed by: PHYSICAL THERAPIST

## 2021-04-19 NOTE — PROGRESS NOTES
Daily Note     Today's date: 2021  Patient name: Stephanie Ling  : 1950  MRN: [id number]  Referring provider: Jose De Jesus Tierney DPM  Dx:   Encounter Diagnosis     ICD-10-CM    1  Plantar fasciitis of left foot  M72 2                   Subjective: Pt reports "doing well" this AM and had min sx's over the weekend  Objective: See treatment diary below    Precautions: None        Manual  4/12/21 4/15/21 4/19/21 4/5/21  4/8/21    RE                                                                          Neuro Re-Ed                            SLS  10"x2  10"x3     TR back to bar 3"x30  3"x30  3"x30  3"x30  3"x30            mini squat 3"x30   NV  3"x30 3"x30    tandem walk  10'x2  20'x2  20'x2 foam 10'x2    tandem stance on foam 20"x3 ea 20"x3  20"x3 ea 15"x3 ea 20"x3 ea   Therex            rec bike w/u 8 mins  7 mins 8 mins  8 mins 10 mins                    Side stepping gtb 20'x2 ea gtb 20'x2  btb at knees 15'x3 rtb 20'x2  rtb 20'x4    prostretch 30"x3 NV 30"x3    30"x3    single leg HR  3"x20 up B, down weight shift to L   3"x20 up B down weight shift 3"x20  3" x25 up B, down WS L  3"x10 up B, down L 3"x20 up B, down weight shift to left    ankle inv tband  gtb 3"x20  gtb 3"x20  gtb 3"x30  rtb 3"x20  gtb 3"x20                    Gait Training                               Modalities             LASER heel pain, chronic  8 mins  8 mins   8 mins 8 mins   8 mins                                         Assessment: Pt cont to progress well with standing exercises, all performed without pain  Pt improves her weight shift during tandem stance on foam to increase WB onto right LE  Pt demo's tandem walk without LOB or difficulty  Plan: Continue per plan of care  Add foam for tandem walk at NV

## 2021-04-22 ENCOUNTER — OFFICE VISIT (OUTPATIENT)
Dept: PHYSICAL THERAPY | Facility: REHABILITATION | Age: 71
End: 2021-04-22
Payer: MEDICARE

## 2021-04-22 DIAGNOSIS — M72.2 PLANTAR FASCIITIS OF LEFT FOOT: Primary | ICD-10-CM

## 2021-04-22 PROCEDURE — 97140 MANUAL THERAPY 1/> REGIONS: CPT | Performed by: PHYSICAL THERAPIST

## 2021-04-22 PROCEDURE — 97150 GROUP THERAPEUTIC PROCEDURES: CPT | Performed by: PHYSICAL THERAPIST

## 2021-04-22 NOTE — PROGRESS NOTES
Daily Note     Today's date: 2021  Patient name: Derek Bland  : 1950  MRN: [id number]  Referring provider: Lisa Boyle DPM  Dx:   Encounter Diagnosis     ICD-10-CM    1  Plantar fasciitis of left foot  M72 2                   Subjective: Pt reports that she's "not doing too bad" for a Thursday  Pt was able to perform her errands this morning without significant heel pain  Objective: See treatment diary below    Precautions: None        Manual  4/12/21 4/15/21 4/19/21 4/22/21  4/8/21    RE                                                                          Neuro Re-Ed                            SLS  10"x2  10"x3 15"x3     TR back to bar 3"x30  3"x30  3"x30  3"x30 3"x30            mini squat 3"x30   NV   3"x30    tandem walk  10'x2  20'x2  20'x2 Foam x2 ea 10'x2    tandem stance on foam 20"x3 ea 20"x3  20"x3 ea 20"x3 ea 20"x3 ea   Therex            rec bike w/u 8 mins  7 mins 8 mins  8 mins 10 mins                    Side stepping gtb 20'x2 ea gtb 20'x2  btb at knees 15'x3 btb 15'x3 ea rtb 20'x4    prostretch 30"x3 NV 30"x3   30"x3  30"x3    single leg HR  3"x20 up B, down weight shift to L   3"x20 up B down weight shift 3"x20  3" x25 up B, down WS L 3"x25 up B, down WS L  3"x20 up B, down weight shift to left    ankle inv tband  gtb 3"x20  gtb 3"x20  gtb 3"x30 btb 3"x25  gtb 3"x20                    Gait Training                               Modalities             LASER heel pain, chronic  8 mins  8 mins   8 mins 8 mins   8 mins                                         Assessment: Pt does demo weight shift posteriorly with SLS when fatigue and encouraged to weight shift forward to increase pressure through midfoot  Pt denies pain throughout standing ex, does note mild heel sx's when standing after LASER, but reports mild and not feeling the need to sit due to sx's  Pt cont to progress funct as part of HEP  Plan: Continue per plan of care

## 2021-04-26 ENCOUNTER — OFFICE VISIT (OUTPATIENT)
Dept: PHYSICAL THERAPY | Facility: REHABILITATION | Age: 71
End: 2021-04-26
Payer: MEDICARE

## 2021-04-26 DIAGNOSIS — M72.2 PLANTAR FASCIITIS OF LEFT FOOT: Primary | ICD-10-CM

## 2021-04-26 PROCEDURE — 97110 THERAPEUTIC EXERCISES: CPT | Performed by: PHYSICAL THERAPIST

## 2021-04-26 PROCEDURE — 97112 NEUROMUSCULAR REEDUCATION: CPT | Performed by: PHYSICAL THERAPIST

## 2021-04-26 NOTE — PROGRESS NOTES
Daily Note     Today's date: 2021  Patient name: Jordan Morgan  : 1950  MRN: [id number]  Referring provider: Akin Beth DPM  Dx:   Encounter Diagnosis     ICD-10-CM    1  Plantar fasciitis of left foot  M72 2                   Subjective: Pt cont to note min pain and sx's present, doing well overall with progression in PT  Objective: See treatment diary below    Precautions: None        Manual  4/12/21 4/15/21 4/19/21 4/22/21 4/26/21    RE                                                                          Neuro Re-Ed                            SLS  10"x2  10"x3 15"x3  15"x3   TR back to bar 3"x30  3"x30  3"x30  3"x30 3"x30             mini squat 3"x30   NV       tandem walk  10'x2  20'x2  20'x2 Foam x2 ea Foam x3 ea    tandem stance on foam 20"x3 ea 20"x3  20"x3 ea 20"x3 ea 20"x3    Therex            rec bike w/u 8 mins  7 mins 8 mins  8 mins 8 mins                     Side stepping gtb 20'x2 ea gtb 20'x2  btb at knees 15'x3 btb 15'x3 ea btb 15'x3     prostretch 30"x3 NV 30"x3   30"x3  30"x3     single leg HR  3"x20 up B, down weight shift to L   3"x20 up B down weight shift 3"x20  3" x25 up B, down WS L 3"x25 up B, down WS L  3"x25 up B, down weight shift to left    ankle inv tband  gtb 3"x20  gtb 3"x20  gtb 3"x30 btb 3"x25  btb 3"x30                     Gait Training                               Modalities             LASER heel pain, chronic  8 mins  8 mins   8 mins 8 mins   8 mins                                         Assessment: Pt cont to demo posterior weight shifting while on left and cues to perform through midfoot with SLS  Pt demo's good progression with ex without increase in pain  Pt has increased independence with HEP  Plan: RE with probable DC NV

## 2021-04-29 ENCOUNTER — EVALUATION (OUTPATIENT)
Dept: PHYSICAL THERAPY | Facility: REHABILITATION | Age: 71
End: 2021-04-29
Payer: MEDICARE

## 2021-04-29 DIAGNOSIS — M72.2 PLANTAR FASCIITIS OF LEFT FOOT: Primary | ICD-10-CM

## 2021-04-29 PROCEDURE — 97110 THERAPEUTIC EXERCISES: CPT | Performed by: PHYSICAL THERAPIST

## 2021-04-29 PROCEDURE — 97140 MANUAL THERAPY 1/> REGIONS: CPT | Performed by: PHYSICAL THERAPIST

## 2021-04-29 NOTE — PROGRESS NOTES
PT Discharge    Today's date: 2021  Patient name: Benita Mckinney  : 1950  MRN: [id number]  Referring provider: Hans Sanon DPM  Dx:   Encounter Diagnosis     ICD-10-CM    1  Plantar fasciitis of left foot  M72 2                   Assessment  Assessment details: Pt has reached max level of funct with PT  Pt is DC'd from PT to cont with HEP  Goals  Funct 1  Stand 30+ minutes with pain <2/10 x4 weeks  -met  2  Ambulation tolerance for 1/2 x4 weeks  -met  Impairment 1  Increase strength by 1/2 grade x4 weeks  --met  2  Decrease pain by 25 % x4 weeks-met            Subjective Evaluation    History of Present Illness  Mechanism of injury: Pt cont to report improvement with PT with less pain overall and return to funct activities  Pt does note restriction from walking and standing at times, feeling as though she gets some knee and foot pain  Pt reports she was busy for 95% of the day in which she had pain up to 7/10 and needed to rest, but notes that she is able to manage with use of ice bottle with less pain afterwards  Pt taking tylenol prn  Pt scores a 69 on her FOTO index     Pain  Current pain ratin  At best pain ratin  At worst pain ratin      Diagnostic Tests  X-ray: normal  Patient Goals  Patient goals for therapy: independence with ADLs/IADLs and decreased pain  Patient goal: met        Objective     Active Range of Motion   Left Ankle/Foot   Normal active range of motion    Passive Range of Motion   Left Ankle/Foot  Normal passive range of motion    Strength/Myotome Testing     Left Hip   Planes of Motion   Flexion: 5  Extension: 4+  Abduction: 4  Adduction: 5  External rotation: 4+  Internal rotation: 4+    Right Hip   Planes of Motion   Flexion: 5  Extension: 4+  Abduction: 4+  Adduction: 5  External rotation: 5  Internal rotation: 5    Left Ankle/Foot   Dorsiflexion: 5  Plantar flexion: 4+  Inversion: 5  Eversion: 5  Great toe flexion: 5  Great toe extension: 5    Additional Strength Details  Knee flex/ext 5/5 bilat  No pain with testing as above and denies TTP  Tests   Left Ankle/Foot   Negative for anterior drawer, calcaneal squeeze, eversion talar tilt, inversion talar tilt, navicular drop, syndesmosis squeeze, syndesmosis external rotation, Tinel's sign (tarsal tunnel), valgus stress metatarsophalangeal, varus stress metatarsophalangeal, valgus tilt, varus tilt and windlass       Precautions: None        [facility] 4/29/21 4/15/21 4/19/21 4/22/21 4/26/21    RE 30 mins                                                                          Neuro Re-Ed                                         SLS  tested 10"x2  10"x3 15"x3  15"x3   TR back to bar hep 3"x30  3"x30  3"x30 3"x30                   mini squat np  NV          tandem walk  tested 20'x2  20'x2 Foam x2 ea Foam x3 ea    tandem stance on foam hep 20"x3  20"x3 ea 20"x3 ea 20"x3    Therex             rec bike w/u 8 mins  7 mins 8 mins  8 mins 8 mins                                 Side stepping hep gtb 20'x2  btb at knees 15'x3 btb 15'x3 ea btb 15'x3     prostretch hep NV 30"x3   30"x3  30"x3     single leg HR tested 3"x20 up B down weight shift 3"x20  3" x25 up B, down WS L 3"x25 up B, down WS L  3"x25 up B, down weight shift to left    ankle inv tband tested gtb 3"x20  gtb 3"x30 btb 3"x25  btb 3"x30                                 Gait Training                                 Modalities             LASER heel pain, chronic  np 8 mins   8 mins 8 mins   8 mins

## 2021-09-04 ENCOUNTER — HOSPITAL ENCOUNTER (EMERGENCY)
Facility: HOSPITAL | Age: 71
Discharge: HOME | End: 2021-09-04
Attending: EMERGENCY MEDICINE
Payer: MEDICARE

## 2021-09-04 VITALS
HEIGHT: 61 IN | SYSTOLIC BLOOD PRESSURE: 143 MMHG | OXYGEN SATURATION: 96 % | HEART RATE: 98 BPM | RESPIRATION RATE: 18 BRPM | BODY MASS INDEX: 29.27 KG/M2 | DIASTOLIC BLOOD PRESSURE: 75 MMHG | WEIGHT: 155 LBS | TEMPERATURE: 97.2 F

## 2021-09-04 DIAGNOSIS — R53.83 FATIGUE: ICD-10-CM

## 2021-09-04 DIAGNOSIS — Z11.52 ENCOUNTER FOR SCREENING FOR COVID-19: Primary | ICD-10-CM

## 2021-09-04 LAB — SARS-COV-2 RNA RESP QL NAA+PROBE: NEGATIVE

## 2021-09-04 PROCEDURE — 99283 EMERGENCY DEPT VISIT LOW MDM: CPT

## 2021-09-04 PROCEDURE — U0003 INFECTIOUS AGENT DETECTION BY NUCLEIC ACID (DNA OR RNA); SEVERE ACUTE RESPIRATORY SYNDROME CORONAVIRUS 2 (SARS-COV-2) (CORONAVIRUS DISEASE [COVID-19]), AMPLIFIED PROBE TECHNIQUE, MAKING USE OF HIGH THROUGHPUT TECHNOLOGIES AS DESCRIBED BY CMS-2020-01-R: HCPCS

## 2021-09-04 PROCEDURE — 99281 EMR DPT VST MAYX REQ PHY/QHP: CPT | Performed by: EMERGENCY MEDICINE

## 2021-09-04 PROCEDURE — U0005 INFEC AGEN DETEC AMPLI PROBE: HCPCS

## 2021-09-04 NOTE — DISCHARGE INSTRUCTIONS
Thank you for coming to the ER today  Please follow up with your primary care doctor in 1-2 days to be re-evaluated  If at any point you experience any new or worsening symptoms do not hesitate to come back to the hospital to be evaluated  Symptoms you should look out for include shortness of breath, increased fevers, or any other new, worsening or concerning symptom  Some will be in contact with the with the results of your COVID test   Thank you and hope you have a great rest of your day

## 2021-09-04 NOTE — ED ATTENDING ATTESTATION
9/4/2021  IDylan MD, saw and evaluated the patient  I have discussed the patient with the resident/non-physician practitioner and agree with the resident's/non-physician practitioner's findings, Plan of Care, and MDM as documented in the resident's/non-physician practitioner's note, except where noted  All available labs and Radiology studies were reviewed  I was present for key portions of any procedure(s) performed by the resident/non-physician practitioner and I was immediately available to provide assistance  At this point I agree with the current assessment done in the Emergency Department    I have conducted an independent evaluation of this patient a history and physical is as follows:  Pt has had the vaccine but recently has had fatigue and recent vomiting and diarrhea no respiratory symptoms PE: alert nad heart reg lungs clear abd soft nontender MDM: will do covid  ED Course         Critical Care Time  Procedures

## 2021-09-05 NOTE — ED PROVIDER NOTES
History  Chief Complaint   Patient presents with    Medical Problem     pt requesting covid test, denies complaints     Patient is a 68-year-old female presenting to the emergency department for COVID test   Per the patient his children want her to get a COVID test before they see her because the patient was recently ill with a GI bug which consisted of nausea vomiting and diarrhea  Patient denies any symptoms or pain at this time  SHe denies any upper respiratory like symptoms, fevers, chills, chest pain, shortness breath, runny nose, loss of taste or smell, abdominal pain, nausea, vomiting, diarrhea, constipation  Patient endorses some fatigue but other than that has no other issues or concerns at this time  Prior to Admission Medications   Prescriptions Last Dose Informant Patient Reported? Taking?   acetaminophen (TYLENOL) 325 mg tablet   Yes No   Sig: Take 650 mg by mouth every 6 (six) hours as needed for mild pain   naproxen (EC NAPROSYN) 500 MG EC tablet   Yes No   Sig: Take 500 mg by mouth 2 (two) times a day with meals      Facility-Administered Medications: None       Past Medical History:   Diagnosis Date    Arthritis     bilat knees       History reviewed  No pertinent surgical history  History reviewed  No pertinent family history  I have reviewed and agree with the history as documented  E-Cigarette/Vaping    E-Cigarette Use Never User      E-Cigarette/Vaping Substances     Social History     Tobacco Use    Smoking status: Never Smoker    Smokeless tobacco: Never Used   Vaping Use    Vaping Use: Never used   Substance Use Topics    Alcohol use: Not on file    Drug use: Not on file        Review of Systems   Constitutional: Positive for fatigue  Negative for activity change, appetite change, chills and fever  HENT: Negative for congestion, ear discharge, ear pain, postnasal drip, rhinorrhea and sore throat      Respiratory: Negative for chest tightness and shortness of breath  Cardiovascular: Negative for chest pain  Gastrointestinal: Negative for abdominal pain, constipation, diarrhea, nausea and vomiting  Genitourinary: Negative for decreased urine volume, difficulty urinating and frequency  Musculoskeletal: Negative for arthralgias  Skin: Negative for color change  Neurological: Negative for weakness and headaches  Psychiatric/Behavioral: Negative for agitation and behavioral problems  All other systems reviewed and are negative  Physical Exam  ED Triage Vitals [09/04/21 1946]   Temperature Pulse Respirations Blood Pressure SpO2   (!) 97 2 °F (36 2 °C) 98 18 143/75 96 %      Temp Source Heart Rate Source Patient Position - Orthostatic VS BP Location FiO2 (%)   Tympanic Monitor Sitting Left arm --      Pain Score       --             Orthostatic Vital Signs  Vitals:    09/04/21 1946   BP: 143/75   Pulse: 98   Patient Position - Orthostatic VS: Sitting       Physical Exam  Vitals and nursing note reviewed  Constitutional:       General: She is not in acute distress  Appearance: Normal appearance  She is normal weight  She is not ill-appearing or toxic-appearing  HENT:      Head: Normocephalic and atraumatic  Right Ear: External ear normal       Left Ear: External ear normal       Nose: Nose normal       Mouth/Throat:      Mouth: Mucous membranes are moist       Pharynx: No oropharyngeal exudate or posterior oropharyngeal erythema  Eyes:      Extraocular Movements: Extraocular movements intact  Conjunctiva/sclera: Conjunctivae normal    Cardiovascular:      Rate and Rhythm: Normal rate and regular rhythm  Pulses: Normal pulses  Heart sounds: Normal heart sounds  Pulmonary:      Effort: Pulmonary effort is normal  No respiratory distress  Breath sounds: Normal breath sounds  No stridor  No wheezing, rhonchi or rales  Abdominal:      General: Abdomen is flat  Palpations: Abdomen is soft  Tenderness:  There is no abdominal tenderness  There is no guarding or rebound  Musculoskeletal:         General: Normal range of motion  Cervical back: Normal range of motion  Skin:     General: Skin is warm  Capillary Refill: Capillary refill takes less than 2 seconds  Neurological:      General: No focal deficit present  Mental Status: She is alert  Psychiatric:         Mood and Affect: Mood normal          Behavior: Behavior normal          ED Medications  Medications - No data to display    Diagnostic Studies  Results Reviewed     Procedure Component Value Units Date/Time    Novel Coronavirus Elsy ROBERTO [facility] [[id number]] Collected: 09/04/21 1952    Lab Status: In process Specimen: Nares from Nose Updated: 09/04/21 1957                 No orders to display         Procedures  Procedures      ED Course  ED Course as of Sep 04 2005   Sat Sep 04, 2021   1955 Will test patient for COVID  Patient is okay for did charge explained to them that somebody would be in contact with her with the results of the COVID test   Patient and her  agree and have no questions at this time  Patient stable for discharge                                          MDM    Disposition  Final diagnoses:   Encounter for screening for COVID-19   Fatigue     Time reflects when diagnosis was documented in both MDM as applicable and the Disposition within this note     Time User Action Codes Description Comment    9/4/2021  7:50 PM Rey Watt Add [Z11 52] Encounter for screening for COVID-19     9/4/2021  7:50 PM Rey Watt Add [R53 83] Fatigue       ED Disposition     ED Disposition Condition Date/Time Comment    Discharge Stable Sat Sep 4, 2021  7:50 PM Tamara Baldwin 435 discharge to home/self care              Follow-up Information     Follow up With Specialties Details Why Contact Info Additional [address] Emergency Department Emergency Medicine Go to  As needed, If symptoms worsen [address] [place] [address] Emergency Department, [address] , [place], [place], [address]   [phone]          Discharge Medication List as of 9/4/2021  7:56 PM      CONTINUE these medications which have NOT CHANGED    Details   acetaminophen (TYLENOL) 325 mg tablet Take 650 mg by mouth every 6 (six) hours as needed for mild pain, Historical Med      naproxen (EC NAPROSYN) 500 MG EC tablet Take 500 mg by mouth 2 (two) times a day with meals, Historical Med           No discharge procedures on file  PDMP Review     None           ED Provider  Attending physically available and evaluated Benny Burnham I managed the patient along with the ED Attending      Electronically Signed by         Adrianna Mueller DO  09/04/21 2005

## 2022-09-04 NOTE — PROGRESS NOTES
Daily Note     Today's date: 3/15/2021  Patient name: Randa Castro  : 1950  MRN: [id number]  Referring provider: Rufus Galvan DPM  Dx:   Encounter Diagnosis     ICD-10-CM    1  Plantar fasciitis of left foot  M72 2                   Subjective: Pt reports doing well with min sx's  Pt cont to note  are the worst due to increase in activity throughout the day as she performs all her errands on that day  Objective: See treatment diary below    Precautions: None        Manual  3/1/21  3/2/21  3/8/21  3/11/21  3/15/21                                                                               Neuro Re-Ed             bridges    5"x10   5"x20  5"x20  hep   clamshells    3"x10  3"x20   3"x20  hep   SLS        10"x3  10"x3    TR back to bar   3"x10   3"x20   3"x20  3"x25    OH reach at wall glute squeeze    5"x10   5"x10   5"x15  alt march at wall nv    mini squat        nv  3"x10     tandem walk         nv  10"x2 ea    tandem stance on foam       nv  x2 ea   Therex             rec bike w/u    5 mins   5 mins   5 mins  6 mins     hip abd SL Hep-review NV  3"x10   3"x20   3"x20  hep    heel raises    5"x10   5"x20   5"x20  5"x25     Side stepping        15ft x2 ea  ytb 20'x2 ea                                                                          Gait Training                                 Modalities             LASER heel pain, chronic  8 mins   8 mins   8 mins  10 mins  10 mins                                         Assessment:  Pt denies any pain throughout exercises as performed  Added new balance/proprioceptive exercises all pain free as well  Pt does have good challenge with SLS and only performed on left secondary to crepitus and pain in right knee  Pt encouraged to cont with HEP for hip strengthening and hold on new exercises until NV  Plan: Cont per POC  The patient is a 54 year old male with PMH of DM Type 1, s/p appendectomy, chronic back pain on NSAIDs x 1 year, ? CKD with recent admission at [facility] in August 2022 for fever + chills + L knee pain s/p arthrocentesis which was negative for septic joint, however blood cultures were positive for MSSA, TTE negative for vegetation, d/c'ed on IV cefazolin. He presents with new onset RLE edema, admitted for acute decompensated heart failure. Nephrology is consulted for elevated creatinine. Patient denies history of CKD and hematuria. Admits to intermittent episodes of frothy urine. Denied difficulty with emptying bladder. Does admit to frequent NSAID use over the past 1 year due to chronic back pain. Family hx of ESRD in father (likely due to diabetic nephropathy).     PAST MEDICAL & SURGICAL HISTORY:  Type 1 diabetes  S/P appendectomy       HPI: The patient is a 54 year old male with PMH of DM Type 1, s/p appendectomy, chronic back pain on NSAIDs x 1 year, ? CKD with recent admission at [facility] in August 2022 for fever + chills + L knee pain s/p arthrocentesis which was negative for septic joint, however blood cultures were positive for MSSA, TTE negative for vegetation, d/c'ed on IV cefazolin. He presents with new onset RLE edema, admitted for acute decompensated heart failure. Nephrology is consulted for elevated creatinine. Patient denies history of CKD and hematuria. Admits to intermittent episodes of frothy urine. Denied difficulty with emptying bladder. Does admit to frequent NSAID use over the past 1 year due to chronic back pain. Family hx of ESRD in father (likely due to diabetic nephropathy).     PAST MEDICAL & SURGICAL HISTORY:  Type 1 diabetes  S/P appendectomy    Allergies:  No Known Allergies  Intolerances    Home Medications:  HumaLOG 100 units/mL injectable solution: 4 unit(s) injectable 3 times a day (before meals) (22 Aug 2022 10:03)  polyethylene glycol 3350 oral powder for reconstitution: 17 gram(s) orally once a day (30 Aug 2022 11:51)  Tresiba 100 units/mL subcutaneous solution: 28 unit(s) subcutaneous once a day (22 Aug 2022 10:03)    FAMILY HISTORY:  Family history of diabetes mellitus (DM) (Father)  FH: leukemia (Father)    Social History: Unclear     ROS: +RLE edema, no SOB, remainder as per HPI    Vital Signs Last 24 Hrs  T(C): 36.7 (04 Sep 2022 12:34), Max: 37.1 (04 Sep 2022 03:38)  T(F): 98.1 (04 Sep 2022 12:34), Max: 98.8 (04 Sep 2022 03:38)  HR: 81 (04 Sep 2022 12:34) (81 - 84)  BP: 164/85 (04 Sep 2022 12:34) (164/85 - 170/80)  BP(mean): --  RR: 18 (04 Sep 2022 12:34) (18 - 18)  SpO2: 98% (04 Sep 2022 12:34) (91% - 98%)    Parameters below as of 04 Sep 2022 12:34  Patient On (Oxygen Delivery Method): room air    I&O's Summary: Not recorded     09-04    135  |  98  |  29.6<H>  ----------------------------<  100<H>  4.7   |  26.0  |  1.85<H>    Ca    9.5      04 Sep 2022 09:15  Phos  3.5     09-04  Mg     1.8     09-04    TPro  6.0<L>  /  Alb  2.9<L>  /  TBili  0.3<L>  /  DBili  x   /  AST  25  /  ALT  8   /  AlkPhos  296<H>  09-04               7.4    12.01 )-----------( 380      ( 04 Sep 2022 09:15 )             22.6     MEDICATIONS  (STANDING):  buMETAnide 1 milliGRAM(s) Oral two times a day  ceFAZolin   IVPB 2000 milliGRAM(s) IV Intermittent every 12 hours  dextrose 5%. 1000 milliLiter(s) (50 mL/Hr) IV Continuous <Continuous>  dextrose 5%. 1000 milliLiter(s) (100 mL/Hr) IV Continuous <Continuous>  dextrose 50% Injectable 25 Gram(s) IV Push once  dextrose 50% Injectable 12.5 Gram(s) IV Push once  dextrose 50% Injectable 25 Gram(s) IV Push once  furosemide   Injectable 40 milliGRAM(s) IV Push daily  glucagon  Injectable 1 milliGRAM(s) IntraMuscular once  heparin   Injectable 5000 Unit(s) SubCutaneous every 8 hours  influenza   Vaccine 0.5 milliLiter(s) IntraMuscular once  insulin glargine Injectable (LANTUS) 14 Unit(s) SubCutaneous at bedtime  insulin lispro (ADMELOG) corrective regimen sliding scale   SubCutaneous three times a day before meals  insulin lispro (ADMELOG) corrective regimen sliding scale   SubCutaneous at bedtime  insulin lispro Injectable (ADMELOG) 2 Unit(s) SubCutaneous three times a day before meals  multivitamin 1 Tablet(s) Oral daily    MEDICATIONS  (PRN):  dextrose Oral Gel 15 Gram(s) Oral once PRN Blood Glucose LESS THAN 70 milliGRAM(s)/deciliter  oxycodone    5 mG/acetaminophen 325 mG 1 Tablet(s) Oral every 6 hours PRN Moderate Pain (4 - 6)  polyethylene glycol 3350 17 Gram(s) Oral daily PRN Constipation

## 2024-12-06 ENCOUNTER — TRANSCRIBE ORDERS (OUTPATIENT)
Dept: LAB | Facility: CLINIC | Age: 74
End: 2024-12-06

## 2024-12-06 ENCOUNTER — APPOINTMENT (OUTPATIENT)
Dept: LAB | Facility: CLINIC | Age: 74
End: 2024-12-06
Payer: MEDICARE

## 2024-12-06 DIAGNOSIS — Z01.818 OTHER SPECIFIED PRE-OPERATIVE EXAMINATION: ICD-10-CM

## 2024-12-06 DIAGNOSIS — Z01.818 OTHER SPECIFIED PRE-OPERATIVE EXAMINATION: Primary | ICD-10-CM

## 2024-12-06 LAB
ALBUMIN SERPL BCG-MCNC: 4.1 G/DL (ref 3.5–5)
ALP SERPL-CCNC: 62 U/L (ref 34–104)
ALT SERPL W P-5'-P-CCNC: 9 U/L (ref 7–52)
ANION GAP SERPL CALCULATED.3IONS-SCNC: 7 MMOL/L (ref 4–13)
AST SERPL W P-5'-P-CCNC: 18 U/L (ref 13–39)
BASOPHILS # BLD AUTO: 0.04 THOUSANDS/ÂΜL (ref 0–0.1)
BASOPHILS NFR BLD AUTO: 1 % (ref 0–1)
BILIRUB SERPL-MCNC: 0.43 MG/DL (ref 0.2–1)
BUN SERPL-MCNC: 14 MG/DL (ref 5–25)
CALCIUM SERPL-MCNC: 9.5 MG/DL (ref 8.4–10.2)
CHLORIDE SERPL-SCNC: 101 MMOL/L (ref 96–108)
CO2 SERPL-SCNC: 29 MMOL/L (ref 21–32)
CREAT SERPL-MCNC: 0.66 MG/DL (ref 0.6–1.3)
EOSINOPHIL # BLD AUTO: 0.11 THOUSAND/ÂΜL (ref 0–0.61)
EOSINOPHIL NFR BLD AUTO: 2 % (ref 0–6)
ERYTHROCYTE [DISTWIDTH] IN BLOOD BY AUTOMATED COUNT: 13.2 % (ref 11.6–15.1)
GFR SERPL CREATININE-BSD FRML MDRD: 86 ML/MIN/1.73SQ M
GLUCOSE P FAST SERPL-MCNC: 86 MG/DL (ref 65–99)
HCT VFR BLD AUTO: 45.7 % (ref 34.8–46.1)
HGB BLD-MCNC: 14.6 G/DL (ref 11.5–15.4)
IMM GRANULOCYTES # BLD AUTO: 0.02 THOUSAND/UL (ref 0–0.2)
IMM GRANULOCYTES NFR BLD AUTO: 0 % (ref 0–2)
LYMPHOCYTES # BLD AUTO: 1.84 THOUSANDS/ÂΜL (ref 0.6–4.47)
LYMPHOCYTES NFR BLD AUTO: 32 % (ref 14–44)
MCH RBC QN AUTO: 29.1 PG (ref 26.8–34.3)
MCHC RBC AUTO-ENTMCNC: 31.9 G/DL (ref 31.4–37.4)
MCV RBC AUTO: 91 FL (ref 82–98)
MONOCYTES # BLD AUTO: 0.39 THOUSAND/ÂΜL (ref 0.17–1.22)
MONOCYTES NFR BLD AUTO: 7 % (ref 4–12)
NEUTROPHILS # BLD AUTO: 3.28 THOUSANDS/ÂΜL (ref 1.85–7.62)
NEUTS SEG NFR BLD AUTO: 58 % (ref 43–75)
NRBC BLD AUTO-RTO: 0 /100 WBCS
PLATELET # BLD AUTO: 371 THOUSANDS/UL (ref 149–390)
PMV BLD AUTO: 9.8 FL (ref 8.9–12.7)
POTASSIUM SERPL-SCNC: 4.3 MMOL/L (ref 3.5–5.3)
PROT SERPL-MCNC: 7.5 G/DL (ref 6.4–8.4)
RBC # BLD AUTO: 5.02 MILLION/UL (ref 3.81–5.12)
SODIUM SERPL-SCNC: 137 MMOL/L (ref 135–147)
WBC # BLD AUTO: 5.68 THOUSAND/UL (ref 4.31–10.16)

## 2024-12-06 PROCEDURE — 85025 COMPLETE CBC W/AUTO DIFF WBC: CPT

## 2024-12-06 PROCEDURE — 80053 COMPREHEN METABOLIC PANEL: CPT

## 2024-12-06 PROCEDURE — 36415 COLL VENOUS BLD VENIPUNCTURE: CPT
